# Patient Record
(demographics unavailable — no encounter records)

---

## 2020-07-17 NOTE — MRI
MRI brain without and with gadolinium contrast

MR venography head



HISTORY: Papilledema. Elevated intracranial pressure. Elevated ammonia level.



FINDINGS: There is no evidence of acute intracranial hemorrhage or infarct. The ventricles appear nor
mal in size, shape and position.



There is no mass effect, shift of midline structures, or abnormal areas of contrast enhancement.



Venous flow well demonstrated within the superior sagittal sinus and straight sinus. Inferior sagitta
l sinus and the left transverse sinus are hypoplastic, with compensatory distention of the cortical

veins.



  



IMPRESSION :

No acute intracranial abnormalities are demonstrated. No evidence of acute dural venous sinus thrombo
sis.



Reported By: MITZY Dodd 

Electronically Signed:  7/17/2020 11:36 PM

## 2020-07-17 NOTE — RAD
Lumbar puncture fluoroscopic guided



HISTORY: Papilledema. Headache.



FINDINGS: After explaining the procedure and answering all questions, the lower back was prepped and 
draped in usual sterile fashion.



Sterile technique, buffered local anesthesia, fluoroscopic guidance, and a posterior L2-3 approach we
re used to carefully advance the tip of a 20-gauge needle to the thecal sac.



Clear CSF was visualized, elevating to a column of 46 cm.



A total of 8 cc clear CSF was collected and submitted for evaluation.



Needle was removed. Patient tolerated the procedure well and was returned in unchanged condition.



Fluoroscopy time 0.2 minutes.



  



IMPRESSION :

Elevated opening CSF pressure of 46 cm.



Reported By: MITZY Dodd 

Electronically Signed:  7/17/2020 6:37 PM

## 2020-07-17 NOTE — HP
PRIMARY CARE PHYSICIAN:  Mukesh.



CHIEF COMPLAINT:  Double vision and episodes of blurred vision with headache.



HISTORY OF PRESENT ILLNESS:  This is a 27-year-old white female with a history 
of

obesity and polycystic ovarian disease.  She developed COVID infection at the 
end of

last month, well over 3 weeks ago.  She was treated with unknown treatments, but

possibly with steroids and antibiotics.  The patient initially had diarrhea, 
nausea

and vomiting along with aching all over, and fevers.  These symptoms improved 
and

mostly resolved about a week ago.  Five days ago, patient started developing 
double

vision, this persisted all the time.  Eventually, she had to wear a patch over 
one

eye, because it was bothering her so much.  She did go to see Dr. Mallory as an

outpatient.  The patient has had a CT angio of the neck with and without 
contrast,

showed no acute abnormalities in anterior or posterior arterial circulation of 
the

neck.  Of note the cavernous sinus, the orbits and the retro-orbital contents 
were

predominantly admitted from the field of view of this examination.  She also 
had an

MRI of the brain with and without contrast.  This showed no evidence of mass,

abnormal signal or abnormal enhancement of any of the sinuses, the bilateral 6

cranial nerves or any other abnormalities of the brain.  No evidence of mass or

aneurysm.  The patient was evaluated today by Dr. Mallory, because of episodes 
for

the last 2 days of posterior headaches along with blurred vision.  These would 
last

up to an hour and then resolve.  She saw him this morning in his clinic, had a

dilated eye exam.  This showed patent bilateral papilledema with some small

hemorrhaging surrounding it.  Her visual acuity at that time was intact and Dr. Mallory has asked her to go into the emergency room, so that she could be 
observed

in the hospital as he was going to be out of town over the weekend.  The patient

does report that when this all started, she had some posterior neck and head 
pain.

She actually went to the ER and it was there she got the CTA of the neck and 
that is

also when she started getting the double vision.  She has had posterior head and

neck pain on and off since then, especially when she gets blurred vision.  She 
has

noticed a little bit of numbness on the back of her head on and off also.  The

patient does report some mild continued nausea since the COVID infection, but 
most

other symptoms have resolved. 



REVIEW OF SYSTEMS:  CONSTITUTIONAL:  See HPI. EYES:  See HPI.  No visual field

defects that she has noticed. 

ENT:  No congestion, drainage or sore throat. 

CARDIOVASCULAR:  No chest pain, no palpitations or racing heart. 

PULMONARY:  No coughing, wheezing or shortness of breath. 

GASTROINTESTINAL:  See HPI.  No current symptoms except little nausea on and 
off. 

GENITOURINARY:  No dysuria or hematuria. 

MUSCULOSKELETAL:  No muscle aches or joint pains currently. 

SKIN:  No rashes or other lesions noted. 

NEUROLOGIC:  Neurologic symptoms are the ones described in the HPI.



PAST MEDICAL HISTORY:  

1. Polycystic ovarian syndrome.  

2. Uterine fibroids.



PAST SURGICAL HISTORY:  None.



PSYCHIATRIC HISTORY:  Positive for anxiety and depression.



SOCIAL HISTORY:  The patient previously drank alcohol and smoked tobacco, has 
not

had any for at least a year.  No illicit drugs. 



FAMILY HISTORY:  No significant family history of neurologic disease.



ALLERGIES:  NO KNOWN DRUG ALLERGIES.



CURRENT MEDICATIONS:  Lexapro 10 mg daily.



PHYSICAL EXAMINATION:

VITAL SIGNS:  Blood pressure 109/66, pulse 58, respirations 18, temperature 97.9
, O2

saturation 96% on room air. 

GENERAL:  This is a well-developed, obese white female, in no acute distress. 

HEENT:  Pupils are equal, round, and reactive to light.  She does some 
dysconjugate

gaze with left cranial nerve 6 palsy.  Oropharynx clear without lesions, 
erythema,

or exudate. 

NECK:  Supple.  No lymphadenopathy.  No thyroid nodules or enlargement.  No 
JVD. 

HEART:  Regular rate and rhythm.  No murmurs, rubs, or gallops. 

LUNGS:  Clear to auscultation bilaterally.  No wheezes, crackles, or rhonchi. 

ABDOMEN:  Soft, obese, nontender to palpation.  Normoactive bowel sounds.  No

hepatosplenomegaly or other masses. 

EXTREMITIES:  No clubbing, cyanosis, or edema. 

SKIN:  No rashes or lesions noted. 

NEUROLOGIC:  The patient has intact strength and sensation in all extremities.  
No

facial droop.  Cranial nerves are intact and equal bilaterally aside from her 
ocular movements as above

PSYCHIATRIC:  Alert and oriented x3.  Normal mood and affect.



LABORATORY DATA:  CBC grossly within normal limits.  White blood cell count 
little

elevated at 11.1.  The rest is normal.  Complete metabolic panel is within 
normal

limits.  Her ammonia level, however, came back greater than 925.  Lipase was

negative.  TSH was negative.  Serum pregnancy was negative.  Urinalysis was 
negative

for infection.  A repeat COVID test was positive.  I did have him do a lumbar

puncture with Radiology, opening pressure was 46 cm.  The CSF had 35 red blood 
cells

in the 1st tube and 5 in the 4th tube, was otherwise colorless and clear fluid.

Glucose was 54, total protein was 25. 



IMAGING:  Results as per HPI.



ASSESSMENT:  

1. Cranial nerve 6 palsy with papilledema and normal MRI consistent with 
diagnosis

of pseudotumor cerebri.  She does have elevated intracranial pressure.  However
, her

ammonia level is severely elevated as well.  I am uncertain about any 
association

between pseudotumor cerebri and hyperammonemia.  Given the severe elevation, she

might have some sort of mild genetic defect in metabolism that has only come 
out due

to steroids given during her COVID infection or due to her COVID infection 
itself.

We will talk with Neurosurgery and see if there is any indication for 
intervention

at this time with mannitol or other interventions.  I am going to start the 
patient

on acetazolamide twice a day and see if she can tolerate that.  For now, she 
does

not have any altered mental status.  No other neurologic signs.  No evidence of

cerebral edema.  We will also consult Neurology as well. 

2. Severe hyperammonemia. Hopefully this is a lab error. Will recheck.

3. Papilledema with normal visual acuity per Dr. Mallory.  We will have her 
follow

up closely with him to make sure she does not have any progression of the 
disease. 

4. Obesity.

5. History of polycystic ovary syndrome.

6. Deep venous thrombosis prophylaxis, put the patient on sequential compression

devices while in bed. 

7. We will hold on Lovenox for right now until we determine she does not need 
any

sort of neurosurgical intervention. 

8. Gastrointestinal prophylaxis, put patient on Pepcid twice a day.



CODE STATUS:  The patient is a full code.  Should she be incapacitated, she 
states

that her sister would be her medical decision maker, her name is Brittany Falk. 







Job ID:  [id number]



[facility]

## 2020-07-18 NOTE — CON
DATE OF CONSULTATION:  07/18/2020



CONSULTING PHYSICIAN:  Hospitalist Service.



IMPRESSION:  Pseudotumor cerebri possibly provoked by COVID infection.



PLAN:  

1. Continue Diamox 500 mg twice a day.

2. Office followup.



HISTORY OF PRESENT ILLNESS:  Ms. Falk is a 27-year-old woman who tested positive

for COVID about 3 weeks ago.  She had been doing relatively well other than having

some fairly severe neck pain.  She developed double vision and was seen by Dr. Mallory, he noted papilledema.  She had an MRI of the brain, which was unremarkable.

 An MRV was also done, which on review showed tenuous right transverse sinus.  She

reports that her headache is relatively good at this point.  She gets some increased

headache when she is upright.  She is still having a double vision.  She denies any

other focal neurologic symptoms, confusion, nausea, vomiting, or difficulty

speaking. 



PAST MEDICAL HISTORY:  Otherwise unremarkable other than obesity.



FAMILY HISTORY:  Noncontributory.



ALLERGIES:  NONE.



SOCIAL HISTORY:  No tobacco or alcohol.



MEDICATIONS:  List was none.



REVIEW OF SYSTEMS:  Ten-system review of systems is otherwise negative.



PHYSICAL EXAMINATION:

GENERAL:  She is somewhat overweight young woman, in no distress. 

VITAL SIGNS:  Have been stable.  She is afebrile. 

HEENT:  Pupils are equal.  Conjunctivae clear.  Oropharynx clear.  Cranium,

normocephalic and atraumatic. 

NECK:  Supple.  No lymphadenopathy. 

CHEST:  Clear. 

ABDOMEN:  Soft, nontender. 

EXTREMITIES:  No edema. 

NEUROLOGIC:  She is alert and appropriate.  Her speech is fluent and clear.  She has

a patch over her left eye.  Therefore, I could not really assess her sixth nerve

function.  The remainder of her cranial nerves are intact.  Motor exam shows good

 strength bilaterally.  There is no fix or drift.  Sensation is intact to touch.

 No abnormal movements are seen.  Gait is not tested. 



IMAGING:  Reviewed.



LABORATORY STUDIES:  Reviewed. 



She is still positive for COVID.



SUMMARY:  This is a young, somewhat overweight young woman who presented with sixth

nerve palsy and papilledema consistent with increased intracranial pressure.  Her

opening pressure was 47.  She is now on Diamox.  Hopefully, this will control the

situation.  I will be happy to follow up with her as an outpatient. 







Job ID:  [id number]

## 2020-07-18 NOTE — PDOC.HOSPP
- Subjective


Encounter Date: 07/18/20


Encounter Time: 14:30


Subjective: 


Patient without any headache or further blurred vision episodes. Still with 

double vision. No neck pain.





- Objective


Vital Signs & Weight: 


 Vital Signs (12 hours)











  Temp Pulse Resp BP Pulse Ox


 


 07/18/20 02:20  97.5 F L  55 L  18  114/66  96








 Weight











Weight                         294 lb 6 oz














Result Diagrams: 


 07/17/20 13:54





 07/17/20 13:54





Hospitalist ROS





- Review of Systems


Constitutional: denies: fever, chills


Respiratory: denies: cough, shortness of breath


Cardiovascular: denies: chest pain, palpitations


Gastrointestinal: denies: nausea, vomiting, abdominal pain


Musculoskeletal: denies: neck pain


Neurological: denies: weakness, numbness, incoordination, change in speech, 

confusion





- Medication


Medications: 


Active Medications











Generic Name Dose Route Start Last Admin





  Trade Name Freq  PRN Reason Stop Dose Admin


 


Acetazolamide  500 mg  07/17/20 21:00  07/18/20 00:05





  Diamox  PO   500 mg





  BID MELI   Administration





     





     





     





     


 


Diazepam  5 mg  07/17/20 21:10  07/17/20 22:25





  Valium  PO  07/18/20 21:11  5 mg





  ONE PRN   Administration





  give prior to MRI   





     





     





     


 


Famotidine  20 mg  07/17/20 21:00  07/18/20 00:05





  Pepcid  PO   20 mg





  BID MELI   Administration





     





     





     





     














- Exam


General Appearance: NAD, awake alert


Eye: PERRL


Eye - other findings: persistent disconjugate gaze


ENT: moist mucosa


Neck: supple


Heart: RRR, no murmur, no gallops, no rubs


Respiratory: CTAB, no wheezes, no rales, no ronchi


Gastrointestinal: soft, non-tender, non-distended, normal bowel sounds


Psychiatric: normal affect, normal behavior, A&O x 3





Hosp A/P


(1) Pseudotumor cerebri syndrome


Code(s): G93.2 - BENIGN INTRACRANIAL HYPERTENSION   Status: Acute   





(2) Hyperammonemia


Code(s): E72.20 - DISORDER OF UREA CYCLE METABOLISM, UNSPECIFIED   Status: Ruled

-out   





(3) Sixth nerve palsy of left eye


Code(s): H49.22 - SIXTH [ABDUCENT] NERVE PALSY, LEFT EYE   Status: Acute   





(4) Papilledema


Status: Acute   





- Plan





MRV negative for venous sinus thrombus


Recheck on ammonia level normal, first lab was an error


Patient stable and seen by Dr. Sebastian, can go home today on acetazolamide and 

follow up with Dr. Mallory on Monday for recheck of vision and Dr. Sebastian 

after that.

## 2020-07-19 NOTE — DIS
DATE OF ADMISSION:  07/17/2020



DATE OF DISCHARGE:  07/18/2020



PRIMARY CARE PHYSICIAN:  Mukesh.



REASON FOR ADMISSION:  Cranial nerve 6 palsy and papilledema.



DIAGNOSES AT DISCHARGE:  

1. Pseudotumor cerebri syndrome.

2. Six nerve palsy of the left eye.

3. Papilledema without vision loss.

4. Polycystic ovarian syndrome.

5. COVID-19 infection.



PROCEDURES:  

1. Lumbar puncture under fluoroscopy showing an elevated opening pressure of 46 cm

of water. 

2. MRI of the veins of the brain with and without contrast showing no acute

intracranial abnormalities.  No evidence of acute dural venous sinus thrombosis. 



CONSULTATION:  

1. Neurology, Sarthak Sebastian MD.

2. Neurosurgery, Nicholas Johnston MD.



SUMMARY OF HOSPITAL COURSE:  This is a 27-year-old white female with a history of

obesity and polycystic ovarian disease.  She developed COVID infection at the end of

last month well over three weeks ago.  She was treated with multiple courses of

steroids as well as some antibiotics.  Her diarrhea, nausea, vomiting, aching all

over, and fevers all resolved about a week ago and then five days ago, the patient

started having severe neck pain and came into the emergency room, had a negative CT

angio of the neck at that time and then developed double vision.  She went to see

Dr. Mallory, the ophthalmologist a couple of times.  He did order an MRI of her

brain, which was negative.  He did see her again the day of admission because she

was having some episodes of posterior headache with blurring of her vision along

with the double vision that was coming and going.  He dilated her eyes and saw

papilledema with some surrounding hemorrhage and so sent her to the emergency room.

In the emergency room, the patient was noted to have normal lab work except for a

massively elevated ammonia of greater than 925.  This was repeated and it was

negative.  She had a lumbar puncture done with the above pressure results.  The

fluid was negative for evidence of infection or other abnormality.  She did have a

repeat COVID test, which continued to be positive.  She was given some dexamethasone

in the emergency room.  I did speak with Dr. Johnston's PA, they discussed the case and

determined that no neurosurgical intervention was needed just medical management for

the elevated intracranial pressure.  I did consult Dr. Sebastian.  He did diagnose her

with pseudotumor cerebri and recommended Diamox twice a day and follow up as an

outpatient.  The patient had no deterioration of her vision during hospitalization.

She had no more incidences of headache with blurred vision, just a persistent six

nerve palsy with double vision and was doing well on the day of discharge.  She did

tolerate the Diamox well without side effects and so she is being discharged home. 



DISCHARGE MANAGEMENT:  Discharged home.



ACTIVITY:  As tolerated.



DIET:  Bariatric diet with recommendations to try and lose some weight to perhaps

help with this as well. 



FOLLOWUP:  Follow up with Dr. Mallory, when he gets back in town on Monday and with

Dr. Sebastian.  She is to call his office on Monday to set up an appointment. 



DISCHARGE MEDICATIONS:  

1. Diamox 500 mg twice a day, 60 tablets dispensed.

2. Continue Lexapro 20 mg daily.







Job ID:  [id number]

## 2020-07-20 NOTE — PQF
CLINICAL DOCUMENTATION CLARIFICATION FORM:



Dear : Munir Lancaster     Date / Time: 07/20/2020 05:46



Please exercise your independent, professional judgment in responding to the 
clarification form. 

Clinical indicators are provided on the bottom of this form for your review





Can you please specify the etiology of patients headache,diplopia and blurring 
of vision?



Please check appropriate box(es):

[ X ] Pseudotumor cerebri

[ X ] Six nerve palsy of left eye

[ X ] Papilledema

[ X ] Covid 19 infection

[  ] Other diagnosis ___________

[  ] Unable to determine



Physician Signature:                Date/Time:



For continuity of documentation, please document condition throughout progress 
notes and discharge summary.  Thank You.





To be completed by CDI/Coding staff for physician review: 







 Present Clinical Indicators - Signs / Symptoms / Labs Results and Location in 
Medical Record

 

[x] CC:double vision and episoded of blurred vision with HA HP 07/17

 

[x] Craniel nerve 6 palsy with papilledema HP 07/17

 

[x] a repeat covid test was positive HP 07/17

 

[x] Impression: pseudotumor cerebir possibly provoked by COVID 19 infection 
Consult 07/18

 

Present Risk Factors                                                           
               Results and Location in Medical Record

 

[x] Pseudotumor cerebri DS 07/18

 

[x] Six nerve palsy of left eye DS 07/18

 

[x] Papilledema DS 07/18

 

[x] Covid 19 infection DS 07/18

 

[x] Obesity DS 07/18

 

Present Treatments                                                             
                 Results and Location in Medical Record

 

[x] Neurology Consult DS 07/18

 

[x] Lumbar puncture DS 07/18

 

[x] MRI of brain DS 07/18

 

[x] Covid19 test DS 07/18

 

[x] Decadron 10mg IV MAR 07/17





CDS/ Signature:Keith Moreno    Phone #: [phone] 
       Date/Time:07/20/2020 05:46



 This is a permanent part of the Medical Record
[facility]